# Patient Record
Sex: FEMALE | Race: WHITE | URBAN - METROPOLITAN AREA
[De-identification: names, ages, dates, MRNs, and addresses within clinical notes are randomized per-mention and may not be internally consistent; named-entity substitution may affect disease eponyms.]

---

## 2017-05-23 ENCOUNTER — TELEPHONE (OUTPATIENT)
Dept: NURSING | Facility: CLINIC | Age: 42
End: 2017-05-23

## 2017-05-23 NOTE — TELEPHONE ENCOUNTER
Pt calling in with concerns that she has recently started to bleed about 15 min after having IC for approx 2 hours.. States is bright red/ no clots/ min cramping. Uses tampon but does not saturate. This has happened 2 times now.   Will Route to Dr Montano to advise

## 2017-06-06 ENCOUNTER — OFFICE VISIT (OUTPATIENT)
Dept: OBGYN | Facility: CLINIC | Age: 42
End: 2017-06-06
Payer: COMMERCIAL

## 2017-06-06 VITALS — WEIGHT: 129 LBS | SYSTOLIC BLOOD PRESSURE: 112 MMHG | BODY MASS INDEX: 21.97 KG/M2 | DIASTOLIC BLOOD PRESSURE: 70 MMHG

## 2017-06-06 DIAGNOSIS — N93.0 POSTCOITAL BLEEDING: Primary | ICD-10-CM

## 2017-06-06 DIAGNOSIS — N83.201 CYST OF RIGHT OVARY: ICD-10-CM

## 2017-06-06 PROCEDURE — G0145 SCR C/V CYTO,THINLAYER,RESCR: HCPCS | Performed by: OBSTETRICS & GYNECOLOGY

## 2017-06-06 PROCEDURE — 87624 HPV HI-RISK TYP POOLED RSLT: CPT | Performed by: OBSTETRICS & GYNECOLOGY

## 2017-06-06 PROCEDURE — 99213 OFFICE O/P EST LOW 20 MIN: CPT | Performed by: OBSTETRICS & GYNECOLOGY

## 2017-06-06 NOTE — MR AVS SNAPSHOT
After Visit Summary   6/6/2017    Asuncion Soler    MRN: [id number]           Patient Information     Date Of Birth          1975        Visit Information        Provider Department      6/6/2017 8:20 AM Carolyn Montano MD [facility] [place]        Today's Diagnoses     Postcoital bleeding    -  1       Follow-ups after your visit        Your next 10 appointments already scheduled     Jun 21, 2017 11:15 AM CDT   US PELVIC COMPLETE W TRANSVAGINAL with WEUS1   [facility] [place] ([facility] [place])    [address] [address]  [place] [address]   [phone]           Please bring a list of your medicines (including vitamins, minerals and over-the-counter drugs). Also, tell your doctor about any allergies you may have. Wear comfortable clothes and leave your valuables at home.  Adults: Drink six 8-ounce glasses of fluid one hour before your exam. Do NOT empty your bladder.  If you need to empty your bladder before your exam, try to release only a little bit of urine. Then, drink another 8oz glass of fluid.  Children: Children who are potty trained should drink at least 4 cups (32 oz) of liquid 45 minutes to one hour prior to the exam. The child s bladder must be full in order to achieve a diagnostic exam. If your child is very uncomfortable or has an urgent need to pee, please notify a technologist; they will try to find out how much longer the wait may be and provide instructions to help relieve the pressure. Occasionally it is medically necessary to insert a urinary catheter to fill the bladder.  Please call the Imaging Department at your exam site with any questions.            Jun 21, 2017 11:30 AM CDT   Office Visit with Carolyn Montano MD   [facility] [place] ([facility] [place])    [address] [id number] [phone]           Bring a current list of meds and any records  "pertaining to this visit.  For Physicals, please bring immunization records and any forms needing to be filled out.  Please arrive 10 minutes early to complete paperwork.              Who to contact     If you have questions or need follow up information about today's clinic visit or your schedule please contact [facility] directly at [phone].  Normal or non-critical lab and imaging results will be communicated to you by MyChart, letter or phone within 4 business days after the clinic has received the results. If you do not hear from us within 7 days, please contact the clinic through [employer] or phone. If you have a critical or abnormal lab result, we will notify you by phone as soon as possible.  Submit refill requests through [employer] or call your pharmacy and they will forward the refill request to us. Please allow 3 business days for your refill to be completed.          Additional Information About Your Visit        MyChart Information     [employer] lets you send messages to your doctor, view your test results, renew your prescriptions, schedule appointments and more. To sign up, go to [email or web address] . Click on \"Log in\" on the left side of the screen, which will take you to the Welcome page. Then click on \"Sign up Now\" on the right side of the page.     You will be asked to enter the access code listed below, as well as some personal information. Please follow the directions to create your username and password.     Your access code is: [email or web address]  Expires: 2017  8:57 AM     Your access code will  in 90 days. If you need help or a new code, please call your [place] [place] clinic or [phone].        Care EveryWhere ID     This is your Care EveryWhere ID. This could be used by other organizations to access your [facility] medical records  [id number]        Your Vitals Were     Last Period BMI (Body Mass Index)                05/15/2017 (Exact Date) 21.97 kg/m2           " Blood Pressure from Last 3 Encounters:   06/06/17 112/70   09/09/16 110/82   08/26/16 116/84    Weight from Last 3 Encounters:   06/06/17 129 lb (58.5 kg)   09/09/16 133 lb (60.3 kg)   08/26/16 130 lb (59 kg)              Today, you had the following     No orders found for display         Today's Medication Changes          These changes are accurate as of: 6/6/17  8:57 AM.  If you have any questions, ask your nurse or doctor.               Stop taking these medicines if you haven't already. Please contact your care team if you have questions.     VITAMIN D (CHOLECALCIFEROL) PO   Stopped by:  Carolyn Montano MD                    Primary Care Provider    Physician No Ref-Primary       No address on file        Thank you!     Thank you for choosing [facility]  for your care. Our goal is always to provide you with excellent care. Hearing back from our patients is one way we can continue to improve our services. Please take a few minutes to complete the written survey that you may receive in the mail after your visit with us. Thank you!             Your Updated Medication List - Protect others around you: Learn how to safely use, store and throw away your medicines at www.disposemymeds.org.          This list is accurate as of: 6/6/17  8:57 AM.  Always use your most recent med list.                   Brand Name Dispense Instructions for use    ATIVAN PO      Take 1 mg by mouth daily as needed       CELEXA PO      Take 10 mg by mouth       WELLBUTRIN PO      Take 450 mg by mouth every morning

## 2017-06-06 NOTE — PROGRESS NOTES
SUBJECTIVE:                                                   Asuncion Soler is a 41 year old female who presents to clinic today for the following health issue(s):  Patient presents with:  Vaginal Bleeding: after IC      Additional information:     HPI:  Patient has had 2 episodes of post coital bleeding. 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   Patient is sexually active, .  Using none for contraception.    reports that she has quit smoking. She has never used smokeless tobacco.    STD testing offered?  Declined    Health maintenance updated:  yes    Today's PHQ-2 Score:   PHQ-2 (  [employer]) 2016   Q1: Little interest or pleasure in doing things 1   Q2: Feeling down, depressed or hopeless 1   PHQ-2 Score 2     Today's PHQ-9 Score: No flowsheet data found.  Today's AUSTIN-7 Score: No flowsheet data found.    Problem list and histories reviewed & adjusted, as indicated.  Additional history: as documented.    Patient Active Problem List   Diagnosis     Overdose     Past Surgical History:   Procedure Laterality Date      SECTION       CYSTECTOMY OVARIAN BENIGN  .     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 other cysts removed were also teratomas      Social History   Substance Use Topics     Smoking status: Former Smoker     Smokeless tobacco: Never Used     Alcohol use 0.0 oz/week     0 Standard drinks or equivalent per week      Problem (# of Occurrences) Relation (Name,Age of Onset)    Breast Cancer (3)  Mother (60): mom is BRCA negative , Maternal Uncle (60): BRCA never checked b/c he doesn't have kids, Paternal Grandmother            Current Outpatient Prescriptions   Medication Sig     Citalopram Hydrobromide (CELEXA PO) Take 10 mg by mouth     BuPROPion HCl (WELLBUTRIN PO) Take 450 mg by mouth every morning     LORazepam (ATIVAN PO) Take 1 mg by mouth daily as needed      No current facility-administered medications for this visit.      Allergies   Allergen Reactions     Compazine [Prochlorperazine]      Flu Virus Vaccine        ROS:  12 point review of systems negative other than symptoms noted below.  Genitourinary: Spotting    OBJECTIVE:     /70  Wt 129 lb (58.5 kg)  LMP 05/15/2017 (Exact Date)  BMI 21.97 kg/m2  Body mass index is 21.97 kg/(m^2).    Exam:  Constitutional:  Appearance: Well nourished, well developed alert, in no acute distress  Gastrointestinal:  Abdominal Examination:  Abdomen nontender to palpation, tone normal without rigidity or guarding, no masses present, umbilicus without lesions; Liver/Spleen:  No hepatomegaly present, liver nontender to palpation; Hernias:  No hernias present  Pelvic Exam:  External Genitalia:     Normal appearance for age, no discharge present, no tenderness present, no inflammatory lesions present, color normal  Vagina:     Normal vaginal vault without central or paravaginal defects, no discharge present, no inflammatory lesions present, no masses present  Bladder:     Nontender to palpation  Urethra:   Urethral Body:  Urethra palpation normal, urethra structural support normal   Urethral Meatus:  No erythema or lesions present  Cervix:     Appearance healthy, no lesions present, nontender to palpation, no bleeding present  Uterus:     Uterus: firm, normal sized and nontender, anteverted in position.   Adnexa:     No adnexal tenderness present, no adnexal masses present  Perineum:     Perineum within normal limits, no evidence of trauma, no rashes or skin  lesions present  Anus:     Anus within normal limits, no hemorrhoids present  Inguinal Lymph Nodes:     No lymphadenopathy present  Pubic Hair:     Normal pubic hair distribution for age  Genitalia and Groin:     No rashes present, no lesions present, no areas of discoloration, no masses present     In-Clinic Test Results:  No results found for this or any previous visit (from the past 24 hour(s)).    ASSESSMENT/PLAN:                                                        ICD-10-CM    1. Postcoital bleeding N93.0 Pap imaged thin layer screen with HPV - recommended age 30 - 65     HPV High Risk Types DNA Cervical       There are no Patient Instructions on file for this visit.    Patient needs to return for Sonohistogram  We discussed possible causes of post coital bleeding  Face to face time 15 minute, more than 50% counceling    Carolyn Montano MD  [facility] [place]

## 2017-06-06 NOTE — LETTER
June 14, 2017    Asuncion Soler  [address] [place] [address]    Dear Asuncion,  We are happy to inform you that your PAP smear result from 6/6/17 is normal.  We are now able to do a follow up test on PAP smears. The DNA test is for HPV (Human Papilloma Virus). Cervical cancer is closely linked with certain types of HPV. Your result showed no evidence of high risk HPV.  Therefore we recommend you return in 3 years for your next pap smear and HPV test.  You will still need to return to the clinic every year for an annual exam and other preventive tests.  Please contact the clinic at [phone] with any questions.  Sincerely,    Carolyn Montano MD/shira

## 2017-06-09 LAB
COPATH REPORT: NORMAL
PAP: NORMAL

## 2017-06-12 LAB
FINAL DIAGNOSIS: NORMAL
HPV HR 12 DNA CVX QL NAA+PROBE: NEGATIVE
HPV16 DNA SPEC QL NAA+PROBE: NEGATIVE
HPV18 DNA SPEC QL NAA+PROBE: NEGATIVE
SPECIMEN DESCRIPTION: NORMAL

## 2022-10-12 DIAGNOSIS — Z87.42 HX OF OVARIAN CYST: Primary | ICD-10-CM

## 2022-10-28 ENCOUNTER — ANCILLARY PROCEDURE (OUTPATIENT)
Dept: ULTRASOUND IMAGING | Facility: CLINIC | Age: 47
End: 2022-10-28
Payer: COMMERCIAL

## 2022-10-28 ENCOUNTER — OFFICE VISIT (OUTPATIENT)
Dept: OBGYN | Facility: CLINIC | Age: 47
End: 2022-10-28
Payer: COMMERCIAL

## 2022-10-28 VITALS
HEIGHT: 64 IN | WEIGHT: 132.8 LBS | BODY MASS INDEX: 22.67 KG/M2 | SYSTOLIC BLOOD PRESSURE: 90 MMHG | DIASTOLIC BLOOD PRESSURE: 60 MMHG

## 2022-10-28 DIAGNOSIS — Z87.42 HX OF OVARIAN CYST: Primary | ICD-10-CM

## 2022-10-28 DIAGNOSIS — Z87.42 HX OF OVARIAN CYST: ICD-10-CM

## 2022-10-28 DIAGNOSIS — N83.201 RIGHT OVARIAN CYST: ICD-10-CM

## 2022-10-28 DIAGNOSIS — Z23 NEED FOR PROPHYLACTIC VACCINATION AND INOCULATION AGAINST INFLUENZA: ICD-10-CM

## 2022-10-28 PROCEDURE — 91313 COVID-19,PF,MODERNA BIVALENT: CPT | Performed by: NURSE PRACTITIONER

## 2022-10-28 PROCEDURE — 90686 IIV4 VACC NO PRSV 0.5 ML IM: CPT | Performed by: NURSE PRACTITIONER

## 2022-10-28 PROCEDURE — 0134A COVID-19,PF,MODERNA BIVALENT: CPT | Performed by: NURSE PRACTITIONER

## 2022-10-28 PROCEDURE — 99203 OFFICE O/P NEW LOW 30 MIN: CPT | Mod: 25 | Performed by: NURSE PRACTITIONER

## 2022-10-28 PROCEDURE — 76830 TRANSVAGINAL US NON-OB: CPT | Performed by: OBSTETRICS & GYNECOLOGY

## 2022-10-28 PROCEDURE — 90471 IMMUNIZATION ADMIN: CPT | Performed by: NURSE PRACTITIONER

## 2022-10-28 RX ORDER — ESCITALOPRAM OXALATE 20 MG/1
20 TABLET ORAL DAILY
COMMUNITY
Start: 2022-10-10

## 2022-10-28 RX ORDER — LAMOTRIGINE 100 MG/1
TABLET ORAL
COMMUNITY
Start: 2022-08-19

## 2022-10-28 RX ORDER — BUPROPION HYDROCHLORIDE 150 MG/1
TABLET ORAL
COMMUNITY
Start: 2022-10-10

## 2022-10-28 RX ORDER — LORAZEPAM 1 MG/1
TABLET ORAL
COMMUNITY
Start: 2022-09-29

## 2022-10-28 RX ORDER — LAMOTRIGINE 150 MG/1
TABLET ORAL
COMMUNITY
Start: 2022-08-19

## 2022-10-28 RX ORDER — BUPROPION HYDROCHLORIDE 300 MG/1
300 TABLET ORAL DAILY
COMMUNITY
Start: 2022-08-19

## 2022-10-28 RX ORDER — CLONAZEPAM 0.5 MG/1
TABLET ORAL
COMMUNITY
Start: 2022-10-10

## 2022-10-28 NOTE — PROGRESS NOTES
SUBJECTIVE:                                                   Asuncion Soler is a 47 year old female who presents to clinic today for the following health issue(s):  Patient presents with:  Follow Up: Us   Imm/Inj: Flu Shot      HPI:  New patient to me here today for ultrasound evaluation of right-sided pelvic pain.  Her left ovary was surgically removed multiple years ago.  She has a history of ovarian cysts in the past.  Her cycles are regular and she has no intermenstrual spotting.    Her last ultrasound was in  showing a right dermoid.  At that time it was stable in size.    Patient's last menstrual period was 10/10/2022 (approximate)..     Patient is sexually active, .  Using condoms for contraception.    reports that she has quit smoking. She has never used smokeless tobacco.    STD testing offered?  Declined    Health maintenance updated:  yes    Today's PHQ-2 Score:   PHQ-2 (  [employer]) 2016   Q1: Little interest or pleasure in doing things 1   Q2: Feeling down, depressed or hopeless 1   PHQ-2 Score 2     Today's PHQ-9 Score: No flowsheet data found.  Today's AUSTIN-7 Score: No flowsheet data found.    Problem list and histories reviewed & adjusted, as indicated.  Additional history: as documented.    Patient Active Problem List   Diagnosis     Overdose     Past Surgical History:   Procedure Laterality Date      SECTION       CYSTECTOMY OVARIAN BENIGN  .     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 other cysts removed were also teratomas      Social History     Tobacco Use     Smoking status: Former     Smokeless tobacco: Never   Substance Use Topics     Alcohol use: Yes     Alcohol/week: 0.0 standard drinks      Problem (# of Occurrences) Relation (Name,Age of Onset)    Breast Cancer (3) Mother (60): mom is BRCA negative , Maternal Uncle (60): BRCA never checked b/c he  "doesn't have kids, Paternal Grandmother            Current Outpatient Medications   Medication Sig     buPROPion (WELLBUTRIN XL) 150 MG 24 hr tablet TAKE ONE (1) TABLET BY MOUTH DAILY WITH 300 MG FOR TOTAL  MG DAILY     buPROPion (WELLBUTRIN XL) 300 MG 24 hr tablet Take 300 mg by mouth daily     clonazePAM (KLONOPIN) 0.5 MG tablet TAKE ONE (1) TABLET BY MOUTH ONCE A DAY, AS NEEDED FOR ANXIETY     escitalopram (LEXAPRO) 20 MG tablet Take 20 mg by mouth daily     lamoTRIgine (LAMICTAL) 100 MG tablet TAKE ONE (1) TABLET BY MOUTH DAILY WITH 150 MG TAB FOR TOTAL  MG DAILY     lamoTRIgine (LAMICTAL) 150 MG tablet TAKE ONE (1) TABLET BY MOUTH DAILY WITH 100 MG FOR TOTAL  MG DAILY     LORazepam (ATIVAN) 1 MG tablet TAKE 1/2 TO ONE (1) TABLET BY MOUTH ONCE A DAY, AS NEEDED FOR SITUATIONAL ANXIETY.     No current facility-administered medications for this visit.     Allergies   Allergen Reactions     Compazine [Prochlorperazine]      Flu Virus Vaccine        ROS:  12 point review of systems negative other than symptoms noted below or in the HPI.  No urinary frequency or dysuria, bladder or kidney problems, Normal menstrual cycles      OBJECTIVE:     BP 90/60   Ht 1.632 m (5' 4.25\")   Wt 60.2 kg (132 lb 12.8 oz)   LMP 10/10/2022 (Approximate)   Breastfeeding No   BMI 22.62 kg/m    Body mass index is 22.62 kg/m .    Exam:  Constitutional:  Appearance: Well nourished, well developed alert, in no acute distress  Psychiatric:  Mentation appears normal and affect normal/bright.     In-Clinic Test Results:  Results for orders placed or performed in visit on 10/28/22 (from the past 24 hour(s))   US Transvaginal Pelvic Non-OB    Narrative    Table formatting from the original result was not included.  US Transvaginal Pelvic Non-OB  Order #: [id number] Accession #: [id number]  Study Notes       Dre Feliz on 10/28/2022 10:33 AM      Gynecological Ultrasound Report  Pelvic U/S - Transvaginal  [facility] [place] [facility] [place] [facility]  Referring Provider: LEOBARDO Thurman CNP  Sonographer:  Dre Feliz RDMS  Indication: History of ovarian cyst  LMP: 10/15/2022  History: Laparoscopic salpingo-oophorectomy, two  surgeries,   ovarian cystectomy  Gynecological Ultrasonography:   Uterus: anteverted. Contour is smooth/regular.  Size: 9.2 x 4.2 x 4.3 cm  Endometrium: Thickness Total 10.7 mm  Findings: Echogenic area near CS scar 0.6 x 0.3 x 0.6 cm, fluid in   cervical region  Right Ovary: 2.3 x 2.0 x 3.6 cm. Hyperechoic areas 1) 1.2 x 0.9 x 1.1 cm &   2) 1.1 x 1.0 x 1.2 cm and probable paraovarian cyst 1.4 x 1.2 x 1.5 cm  Left Ovary: Surgically Absent  Cul de Sac Free Fluid: Trace     Impression:            The endometrium appeared normal, trilaminar and 10.8mm. there was   distortion of the KAUSHAL endometrium at area of prior  section scar  There is some question of polyp at the CS scar measuring 6 x 3 x 6 mm but   this is more likely distortion from prior surgery  There is a 1.2 x 0.9 x 1.1 hyperechoic cyst in the right ovary as well as   another of similar size and this appears most consistent with dermoid.  There is a paraovarian cyst on the right that is simple and 1.4 x 1.2 x   1.5cm  Left ovary is surgically absent  The cervical canal has fluid  Pelvis with trace fluid    Tamiko Muse MD         ASSESSMENT/PLAN:                                                        ICD-10-CM    1. Hx of ovarian cyst  Z87.42       2. Need for prophylactic vaccination and inoculation against influenza  Z23 INFLUENZA VACCINE IM > 6 MONTHS VALENT IIV4 (AFLURIA/FLUZONE)      3. Right ovarian cyst  N83.201 US Transvaginal Pelvic Non-OB          There are no Patient Instructions on file for this visit.    Ultrasound shows Her left ovary is surgically absent.  She does have 3 cyst on her ovary on the right side- one of which is her dermoid that was documented in 2016.  We recommended follow-up ultrasound in 6 to 8 weeks.  We also did  offer low-dose hormonal contraception for ovarian cyst suppression.    (5 minutes was spent on the date of the encounter doing chart review, review of outside records, review and interpretation of pertinent test results, history and exam, documentation, patient counseling, and further activities as noted above.)      LEOBARDO [facility]

## 2022-11-19 ENCOUNTER — HEALTH MAINTENANCE LETTER (OUTPATIENT)
Age: 47
End: 2022-11-19

## 2023-05-03 ENCOUNTER — MYC MEDICAL ADVICE (OUTPATIENT)
Dept: OBGYN | Facility: CLINIC | Age: 48
End: 2023-05-03
Payer: COMMERCIAL

## 2023-05-03 NOTE — TELEPHONE ENCOUNTER
Panel Management Review    Date of last visit with a [facility] provider: Morenita Cagle on 10/28/22.  Date of next visit with a [facility] provider: None.    Health Maintenance List    Health Maintenance   Topic Date Due     ADVANCE CARE PLANNING  Never done     HEPATITIS B IMMUNIZATION (1 of 3 - 3-dose series) Never done     COLORECTAL CANCER SCREENING  Never done     HIV SCREENING  Never done     HEPATITIS C SCREENING  Never done     HPV TEST  2020     PAP  2020     LIPID  Never done     YEARLY PREVENTIVE VISIT  2022     MAMMO SCREENING  2022     PHQ-2 (once per calendar year)  2023     DTAP/TDAP/TD IMMUNIZATION (2 - Td or Tdap) 2029     INFLUENZA VACCINE  Completed     COVID-19 Vaccine  Completed     Pneumococcal Vaccine: Pediatrics (0 to 5 Years) and At-Risk Patients (6 to 64 Years)  Aged Out     IPV IMMUNIZATION  Aged Out     MENINGITIS IMMUNIZATION  Aged Out       Composite cancer screening  Chart review shows that this patient is due/due soon for the following Colonoscopy  Lab Results   Component Value Date    PAP NIL 2017     Past Surgical History:   Procedure Laterality Date      SECTION       CYSTECTOMY OVARIAN BENIGN  .     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 other cysts removed were also teratomas     Summary:    Patient is due/failing the following:   Colonoscopy    Action needed: Patient needs referral/order:     Type of outreach:  Sent medineering message.      Staff Signature:  Priya Millan MA on 5/3/2023 at 9:52 AM

## 2023-09-14 ENCOUNTER — MEDICAL CORRESPONDENCE (OUTPATIENT)
Dept: HEALTH INFORMATION MANAGEMENT | Facility: CLINIC | Age: 48
End: 2023-09-14
Payer: COMMERCIAL

## 2023-10-04 ENCOUNTER — APPOINTMENT (OUTPATIENT)
Dept: URBAN - METROPOLITAN AREA CLINIC 256 | Age: 48
Setting detail: DERMATOLOGY
End: 2023-10-04

## 2023-10-04 VITALS — HEIGHT: 55 IN | WEIGHT: 130 LBS

## 2023-10-04 DIAGNOSIS — L82.1 OTHER SEBORRHEIC KERATOSIS: ICD-10-CM

## 2023-10-04 DIAGNOSIS — L57.8 OTHER SKIN CHANGES DUE TO CHRONIC EXPOSURE TO NONIONIZING RADIATION: ICD-10-CM

## 2023-10-04 DIAGNOSIS — D22 MELANOCYTIC NEVI: ICD-10-CM

## 2023-10-04 DIAGNOSIS — L21.8 OTHER SEBORRHEIC DERMATITIS: ICD-10-CM

## 2023-10-04 DIAGNOSIS — Z71.89 OTHER SPECIFIED COUNSELING: ICD-10-CM

## 2023-10-04 DIAGNOSIS — D18.0 HEMANGIOMA: ICD-10-CM

## 2023-10-04 PROBLEM — D22.61 MELANOCYTIC NEVI OF RIGHT UPPER LIMB, INCLUDING SHOULDER: Status: ACTIVE | Noted: 2023-10-04

## 2023-10-04 PROBLEM — D22.72 MELANOCYTIC NEVI OF LEFT LOWER LIMB, INCLUDING HIP: Status: ACTIVE | Noted: 2023-10-04

## 2023-10-04 PROBLEM — D22.5 MELANOCYTIC NEVI OF TRUNK: Status: ACTIVE | Noted: 2023-10-04

## 2023-10-04 PROBLEM — D22.62 MELANOCYTIC NEVI OF LEFT UPPER LIMB, INCLUDING SHOULDER: Status: ACTIVE | Noted: 2023-10-04

## 2023-10-04 PROBLEM — D22.71 MELANOCYTIC NEVI OF RIGHT LOWER LIMB, INCLUDING HIP: Status: ACTIVE | Noted: 2023-10-04

## 2023-10-04 PROBLEM — D18.01 HEMANGIOMA OF SKIN AND SUBCUTANEOUS TISSUE: Status: ACTIVE | Noted: 2023-10-04

## 2023-10-04 PROCEDURE — OTHER SUNSCREEN RECOMMENDATIONS: OTHER

## 2023-10-04 PROCEDURE — 99203 OFFICE O/P NEW LOW 30 MIN: CPT

## 2023-10-04 PROCEDURE — OTHER COUNSELING: OTHER

## 2023-10-04 PROCEDURE — OTHER OTC TREATMENT REGIMEN: OTHER

## 2023-10-04 PROCEDURE — OTHER MIPS QUALITY: OTHER

## 2023-10-04 ASSESSMENT — LOCATION SIMPLE DESCRIPTION DERM
LOCATION SIMPLE: LEFT LOWER BACK
LOCATION SIMPLE: UPPER BACK
LOCATION SIMPLE: LEFT CHEEK
LOCATION SIMPLE: RIGHT THIGH
LOCATION SIMPLE: LEFT FOREARM
LOCATION SIMPLE: CHEST
LOCATION SIMPLE: ABDOMEN
LOCATION SIMPLE: LEFT UPPER ARM
LOCATION SIMPLE: LEFT THIGH
LOCATION SIMPLE: LEFT SCALP
LOCATION SIMPLE: LEFT UPPER BACK
LOCATION SIMPLE: RIGHT FOREARM

## 2023-10-04 ASSESSMENT — LOCATION ZONE DERM
LOCATION ZONE: ARM
LOCATION ZONE: LEG
LOCATION ZONE: SCALP
LOCATION ZONE: TRUNK
LOCATION ZONE: FACE

## 2023-10-04 ASSESSMENT — LOCATION DETAILED DESCRIPTION DERM
LOCATION DETAILED: RIGHT PROXIMAL DORSAL FOREARM
LOCATION DETAILED: LEFT ANTERIOR PROXIMAL THIGH
LOCATION DETAILED: LEFT MEDIAL UPPER BACK
LOCATION DETAILED: RIGHT ANTERIOR PROXIMAL THIGH
LOCATION DETAILED: RIGHT VENTRAL DISTAL FOREARM
LOCATION DETAILED: MIDDLE STERNUM
LOCATION DETAILED: LEFT VENTRAL PROXIMAL FOREARM
LOCATION DETAILED: LEFT ANTERIOR DISTAL THIGH
LOCATION DETAILED: RIGHT ANTERIOR DISTAL THIGH
LOCATION DETAILED: LEFT INFERIOR LATERAL MIDBACK
LOCATION DETAILED: LEFT INFERIOR CENTRAL MALAR CHEEK
LOCATION DETAILED: LEFT DISTAL DORSAL FOREARM
LOCATION DETAILED: LEFT ANTECUBITAL SKIN
LOCATION DETAILED: EPIGASTRIC SKIN
LOCATION DETAILED: LEFT MEDIAL FRONTAL SCALP
LOCATION DETAILED: LEFT VENTRAL DISTAL FOREARM
LOCATION DETAILED: RIGHT VENTRAL PROXIMAL FOREARM
LOCATION DETAILED: INFERIOR THORACIC SPINE

## 2023-10-04 NOTE — PROCEDURE: OTC TREATMENT REGIMEN
Patient Specific Otc Recommendations (Will Not Stick From Patient To Patient): Recommended use of H&S vs Selsun Blue. If no improvement, may consider ketoconazole 2% shampoo.
Detail Level: Zone

## 2024-01-27 ENCOUNTER — HEALTH MAINTENANCE LETTER (OUTPATIENT)
Age: 49
End: 2024-01-27

## 2024-11-27 ENCOUNTER — TELEPHONE (OUTPATIENT)
Dept: CONSULT | Facility: CLINIC | Age: 49
End: 2024-11-27
Payer: COMMERCIAL

## 2024-11-27 NOTE — TELEPHONE ENCOUNTER
At her request, I contacted Justina to discuss her son Ric' Genetics referral due to hypermobility.  During our call Justina explained that in addition to hypermobility he also has anemia, chronic nosebleeds, and a speech impediment, and is concerned that Ric should be seen sooner than our next available MD appointment next summer.  Justina also disclosed that her cousin has a diagnosis of vascular Red-Danlos syndrome.  I inquired whether this cousin has had genetic testing and if so, if Justina would be able to obtain a copy of this report.  Justina thought she could, and will send it to me once she is able to obtain it.  Once obtained we could schedule a genetic counseling appointment in the meantime for familial variant testing.  It would likely be most prudent to begin testing for Justina, as this would offer information for both of her children.  Once I receive a copy of her cousin's report I will contact Justina again to schedule an appointment.  Justina expressed understanding and agreement with this plan.  I remain available for additional questions in the meantime.         Roberta Carter MS [facility]  Genetic Counselor  Division of Genetics and Metabolism

## 2025-02-01 ENCOUNTER — HEALTH MAINTENANCE LETTER (OUTPATIENT)
Age: 50
End: 2025-02-01